# Patient Record
(demographics unavailable — no encounter records)

---

## 2025-05-23 NOTE — HISTORY OF PRESENT ILLNESS
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  She presents complaining of right greater than left hip pain that began spontaneously a few years ago pain is over the lateral aspect of the hip aggravated by walking and interfering with sleep.  Patient also reports chronic low back pain denies numbness or tingling lower extremities but does report bilateral leg cramps at night.  Patient has undergone steroid and viscosupplementation injections into the right hip without sustained symptom improvement.  She ambulates independently.

## 2025-05-23 NOTE — DISCUSSION/SUMMARY
[de-identified] : Impression end-stage osteoarthritis right left hip, lumbar degenerative disc disease Recommendation I explained to patient and accompanying daughter that given the chronicity of patient's pain compromised quality of life and failure to improve in response to comprehensive medical management I have recommended total hip replacement.  Have discussed risk benefits and alternatives

## 2025-05-23 NOTE — PHYSICAL EXAM
[de-identified] : Constitutional:Well nourished , well developed and in no acute distress Psychiatric: Alert and oriented to time place and person.Appropriate affect  Skin:Head, neck, arms and lower extremities:no lesions or discoloration HEENT: Normocephalic, EOM intact, Nasal septum midline, Respiratory: Unlabored respirations,no audible wheezing ,no tachypnea, no cyanosis Cardiovascular: no leg swelling  no ankle edema no JVD, pulse regular Vascular: no calf or thigh tenderness,  Peripheral pulses; intact Lymphatics:No groin adenopathy,no lymphedema lower  or upper extremities Vascular dorsal pedal pulse left 1+ posterior tibial pulse right 1+ Motor power tibialis anterior EHL peroneals 5/5 right equals left Right left hip markedly positive logroll bilaterally.  No shortening [de-identified] : X-ray AP pelvis right and left hip AP and lateral superolateral joint space narrowing bone-on-bone subchondral sclerosis marginal osteophytes bilaterally X-ray lumbosacral spine multilevel lumbar degenerative disc disease

## 2025-06-27 NOTE — PHYSICAL EXAM
[de-identified] : Constitutional:Well nourished , well developed and in no acute distress Psychiatric: Alert and oriented to time place and person.Appropriate affect  Skin:Head, neck, arms and lower extremities:no lesions or discoloration HEENT: Normocephalic, EOM intact, Nasal septum midline, Respiratory: Unlabored respirations,no audible wheezing ,no tachypnea, no cyanosis Cardiovascular: no leg swelling  no ankle edema no JVD, pulse regular Vascular: no calf or thigh tenderness,  Peripheral pulses; intact Lymphatics:No groin adenopathy,no lymphedema lower  or upper extremities Vascular dorsal pedal pulse left 1+ posterior tibial pulse right 1+ Motor power tibialis anterior EHL peroneals 5/5 right equals left Right  hip markedly positive logroll bilaterally.  No shortening Skin intact [de-identified] : X-ray AP pelvis right lateral superolateral joint space narrowing bone-on-bone subchondral sclerosis marginal osteophytes bilaterally X-ray lumbosacral spine multilevel lumbar degenerative disc disease

## 2025-06-27 NOTE — DISCUSSION/SUMMARY
[de-identified] : Impression end-stage osteoarthritis rightHip Recommendation I explained to patient and accompanying daughter that given the chronicity of patient's pain compromised quality of life and failure to improve in response to comprehensive medical management I have recommended total hip replacement.  Have discussed risk benefits and alternatives

## 2025-06-27 NOTE — HISTORY OF PRESENT ILLNESS
[de-identified] : Follow-up right hip pain hip pain that began spontaneously a few years ago pain is over the lateral aspect of the hip aggravated by walking and interfering with sleep.  Patient also reports chronic low back pain denies numbness or tingling lower extremities but does report bilateral leg cramps at night.  Patient has undergone steroid and viscosupplementation injections into the right hip without sustained symptom improvement.  She ambulates independently. Scheduled for right total hip replacement presents for skin check

## 2025-07-16 NOTE — HISTORY OF PRESENT ILLNESS
[de-identified] : Right total hip replacement. 07/01/2025 [de-identified] : CROW PHILIP is a 68-year-old female presents for evaluation s/p Right total hip replacement. Significant improvement in preoperative pain. Denies fever, chills, groin pain, thigh pain. The patient does describe some residual numbness at operative site. Slight soreness near incision site when ambulating. Ambulating with a walker at this time.  [de-identified] : Constitutional: Well nourished, no distress. No neurovascular symptoms.   o Right hip Exam: Incision healed, minimal erythema proximal to the incision. clean dry, and in tact. Sutures in tact. Satisfactory gait. Leg lengths equal. Passive motion is satisfactory pain free. Ligaments intact. resisted hip flexion 5/5, resisted hip abduction 5/5.  [de-identified] : X-rays of the AP pelvis and AP, lateral of the right hip obtained today 07/16/2025 demonstrates total hip replacement components in good alignment,   [de-identified] : Right total hip replacement. 07/01/2025.  [de-identified] : 67 y/o female s/p right THR.   Wound care instructions provided today. They have been instructed on exercises to avoid, including running, jumping, squatting, lunging. PT.   Follow up 3 weeks.

## 2025-07-16 NOTE — ADDENDUM
[FreeTextEntry1] : This note was written by Astrid Puente on 07/16/2025 acting solely as a scribe for Dr. Dion Hills.    All medical record entries made by the Scribe were at my, Dr. Dion Hills, direction and personally dictated by me on 07/16/2025. I have personally reviewed the chart and agree that the record accurately reflects my personal performance of the history, physical exam, assessment and plan.